# Patient Record
Sex: OTHER/UNKNOWN | URBAN - NONMETROPOLITAN AREA
[De-identification: names, ages, dates, MRNs, and addresses within clinical notes are randomized per-mention and may not be internally consistent; named-entity substitution may affect disease eponyms.]

---

## 2024-08-07 ENCOUNTER — APPOINTMENT (OUTPATIENT)
Dept: URBAN - NONMETROPOLITAN AREA SURGERY 8 | Age: 49
Setting detail: DERMATOLOGY
End: 2024-08-15

## 2024-08-07 DIAGNOSIS — L71.8 OTHER ROSACEA: ICD-10-CM

## 2024-08-07 PROCEDURE — OTHER COUNSELING: OTHER

## 2024-08-07 PROCEDURE — OTHER PRESCRIPTION: OTHER

## 2024-08-07 PROCEDURE — 99203 OFFICE O/P NEW LOW 30 MIN: CPT

## 2024-08-07 PROCEDURE — OTHER MIPS QUALITY: OTHER

## 2024-08-07 RX ORDER — MINOCYCLINE HYDROCHLORIDE 100 MG/1
CAPSULE ORAL BID
Qty: 30 | Refills: 1 | Status: ERX | COMMUNITY
Start: 2024-08-07

## 2024-08-07 ASSESSMENT — LOCATION DETAILED DESCRIPTION DERM: LOCATION DETAILED: LEFT INFERIOR CENTRAL MALAR CHEEK

## 2024-08-07 ASSESSMENT — LOCATION ZONE DERM: LOCATION ZONE: FACE

## 2024-08-07 ASSESSMENT — LOCATION SIMPLE DESCRIPTION DERM: LOCATION SIMPLE: LEFT CHEEK

## 2024-08-07 NOTE — PROCEDURE: MIPS QUALITY
Detail Level: Detailed
Quality 431: Preventive Care And Screening: Unhealthy Alcohol Use - Screening: Patient not identified as an unhealthy alcohol user when screened for unhealthy alcohol use using a systematic screening method
Quality 110: Preventive Care And Screening: Influenza Immunization: Influenza Immunization previously received during influenza season
Quality 402: Tobacco Use And Help With Quitting Among Adolescents: Patient screened for tobacco and never smoked
Quality 226: Preventive Care And Screening: Tobacco Use: Screening And Cessation Intervention: Patient screened for tobacco use and is an ex/non-smoker
Quality 130: Documentation Of Current Medications In The Medical Record: Current Medications Documented

## 2024-08-07 NOTE — PROCEDURE: COUNSELING
Topical Retinoids Recommendations: For some with rosacea, retinoid based products may be irritating and to be used with caution. Mechanical scrubs and exfoliants may be irritating to the skin and should be used with caution.
Moisturizer Recommendations: AM Use: Cerave AM, Cetaphil Oil Control or any other oil-free facial moisturizer with SPF 30+. Sunscreen should be applied each and every day, regardless of the weather or activity level\\nPM Use: Cerave PM can be applied over topical medications (wait 15 minutes between applications)
Detail Level: Detailed
Laser Recommendations: Vascular lasers that target background redness or telangectasias may be recommended. These lasers are often not covered by insurance but may be covered by some FSA or HSA plans .\\nOther types of laser therapies may be used to manage textural changes of the skin, due to rosacea.
Sunscreen Recommendations: Facial Sunscreens as noted above. Sunscreen should be applied daily and then q 80 minutes to exposed areas. There are many different sunscreen products on the market and may be found in a cream/spray/lotion/stick/powder. Use whatever product that feels good on your skin.
Cleanser Recommendations: Cerave Gentle Cleanser\\nCerave Foaming Cleanser\\nIf instructed, a benzoyl peroxide wash or salacylic acid wash can be purchased over the counter
Ipl Recommendations: Intense Pulse LIght or Broad Band Light can be used as needed to manage background erythema. The frequency of treatment will depend on desired goals. these light therapies need to be repeated over time. the light therapies are often not covered by insurance but may be covered by some FSA or HSA plans

## 2024-09-26 ENCOUNTER — APPOINTMENT (OUTPATIENT)
Dept: URBAN - NONMETROPOLITAN AREA SURGERY 8 | Age: 49
Setting detail: DERMATOLOGY
End: 2024-09-26

## 2024-09-26 ENCOUNTER — APPOINTMENT (OUTPATIENT)
Dept: URBAN - NONMETROPOLITAN AREA SURGERY 8 | Age: 49
Setting detail: DERMATOLOGY
End: 2024-09-30

## 2024-09-26 DIAGNOSIS — D485 NEOPLASM OF UNCERTAIN BEHAVIOR OF SKIN: ICD-10-CM

## 2024-09-26 DIAGNOSIS — L71.8 OTHER ROSACEA: ICD-10-CM

## 2024-09-26 PROBLEM — D48.5 NEOPLASM OF UNCERTAIN BEHAVIOR OF SKIN: Status: ACTIVE | Noted: 2024-09-26

## 2024-09-26 PROCEDURE — OTHER COUNSELING: OTHER

## 2024-09-26 PROCEDURE — OTHER PRESCRIPTION: OTHER

## 2024-09-26 PROCEDURE — 99214 OFFICE O/P EST MOD 30 MIN: CPT | Mod: 25

## 2024-09-26 PROCEDURE — OTHER PRESCRIPTION MEDICATION MANAGEMENT: OTHER

## 2024-09-26 PROCEDURE — 11102 TANGNTL BX SKIN SINGLE LES: CPT

## 2024-09-26 PROCEDURE — OTHER BIOPSY BY SHAVE METHOD: OTHER

## 2024-09-26 PROCEDURE — OTHER MIPS QUALITY: OTHER

## 2024-09-26 RX ORDER — CREAM BASE NO.171
CREAM (GRAM) MISCELLANEOUS
Qty: 30 | Refills: 12 | Status: ACTIVE

## 2024-09-26 ASSESSMENT — LOCATION SIMPLE DESCRIPTION DERM
LOCATION SIMPLE: LEFT CHEEK
LOCATION SIMPLE: LEFT UPPER BACK

## 2024-09-26 ASSESSMENT — LOCATION ZONE DERM
LOCATION ZONE: FACE
LOCATION ZONE: TRUNK

## 2024-09-26 ASSESSMENT — LOCATION DETAILED DESCRIPTION DERM
LOCATION DETAILED: LEFT INFERIOR LATERAL UPPER BACK
LOCATION DETAILED: LEFT INFERIOR CENTRAL MALAR CHEEK

## 2024-09-26 NOTE — PROCEDURE: MIPS QUALITY
Detail Level: Detailed
Quality 402: Tobacco Use And Help With Quitting Among Adolescents: Patient screened for tobacco and never smoked
Quality 431: Preventive Care And Screening: Unhealthy Alcohol Use - Screening: Patient not identified as an unhealthy alcohol user when screened for unhealthy alcohol use using a systematic screening method
Quality 130: Documentation Of Current Medications In The Medical Record: Current Medications Documented
Quality 226: Preventive Care And Screening: Tobacco Use: Screening And Cessation Intervention: Patient screened for tobacco use and is an ex/non-smoker
Quality 110: Preventive Care And Screening: Influenza Immunization: Influenza Immunization previously received during influenza season

## 2024-09-26 NOTE — PROCEDURE: BIOPSY BY SHAVE METHOD
Body Location Override (Optional - Billing Will Still Be Based On Selected Body Map Location If Applicable): left lateral back
Detail Level: Detailed
Depth Of Biopsy: dermis
Was A Bandage Applied: Yes
Size Of Lesion In Cm: 0
Biopsy Type: H and E
Biopsy Method: Dermablade
Anesthesia Type: 1% lidocaine with epinephrine
Anesthesia Volume In Cc: 0.5
Hemostasis: Drysol
Wound Care: Petrolatum
Dressing: bandage
Destruction After The Procedure: No
Type Of Destruction Used: Curettage
Curettage Text: The wound bed was treated with curettage after the biopsy was performed.
Cryotherapy Text: The wound bed was treated with cryotherapy after the biopsy was performed.
Electrodesiccation Text: The wound bed was treated with electrodesiccation after the biopsy was performed.
Electrodesiccation And Curettage Text: The wound bed was treated with electrodesiccation and curettage after the biopsy was performed.
Silver Nitrate Text: The wound bed was treated with silver nitrate after the biopsy was performed.
Lab: -[id number]
Consent: Written consent was obtained and risks were reviewed including but not limited to scarring, infection, bleeding, scabbing, incomplete removal, nerve damage and allergy to anesthesia.
Post-Care Instructions: I reviewed with the patient in detail post-care instructions. Patient is to keep the biopsy site dry overnight, and then apply bacitracin twice daily until healed. Patient may apply hydrogen peroxide soaks to remove any crusting.
Notification Instructions: Patient will be notified of biopsy results. However, patient instructed to call the office if not contacted within 2 weeks.
Billing Type: Third-Party Bill
Information: Selecting Yes will display possible errors in your note based on the variables you have selected. This validation is only offered as a suggestion for you. PLEASE NOTE THAT THE VALIDATION TEXT WILL BE REMOVED WHEN YOU FINALIZE YOUR NOTE. IF YOU WANT TO FAX A PRELIMINARY NOTE YOU WILL NEED TO TOGGLE THIS TO 'NO' IF YOU DO NOT WANT IT IN YOUR FAXED NOTE.

## 2024-09-26 NOTE — PROCEDURE: COUNSELING
Topical Retinoids Recommendations: For some with rosacea, retinoid based products may be irritating and to be used with caution. Mechanical scrubs and exfoliants may be irritating to the skin and should be used with caution.
Detail Level: Detailed
Moisturizer Recommendations: AM Use: Cerave AM, Cetaphil Oil Control or any other oil-free facial moisturizer with SPF 30+. Sunscreen should be applied each and every day, regardless of the weather or activity level\\nPM Use: Cerave PM can be applied over topical medications (wait 15 minutes between applications)
Laser Recommendations: Vascular lasers that target background redness or telangectasias may be recommended. These lasers are often not covered by insurance but may be covered by some FSA or HSA plans .\\nOther types of laser therapies may be used to manage textural changes of the skin, due to rosacea.
Sunscreen Recommendations: Facial Sunscreens as noted above. Sunscreen should be applied daily and then q 80 minutes to exposed areas. There are many different sunscreen products on the market and may be found in a cream/spray/lotion/stick/powder. Use whatever product that feels good on your skin.
Cleanser Recommendations: Cerave Gentle Cleanser\\nCerave Foaming Cleanser\\nIf instructed, a benzoyl peroxide wash or salacylic acid wash can be purchased over the counter
Ipl Recommendations: Intense Pulse LIght or Broad Band Light can be used as needed to manage background erythema. The frequency of treatment will depend on desired goals. these light therapies need to be repeated over time. the light therapies are often not covered by insurance but may be covered by some FSA or HSA plans

## 2024-09-26 NOTE — PROCEDURE: PRESCRIPTION MEDICATION MANAGEMENT
Continue Regimen: triple cream qd.\\nwe discussed lasers and lights as a complement to treatment
Render In Strict Bullet Format?: No
Detail Level: Zone

## 2025-01-29 ENCOUNTER — APPOINTMENT (OUTPATIENT)
Dept: URBAN - NONMETROPOLITAN AREA SURGERY 8 | Age: 50
Setting detail: DERMATOLOGY
End: 2025-01-30

## 2025-01-29 DIAGNOSIS — L71.8 OTHER ROSACEA: ICD-10-CM

## 2025-01-29 DIAGNOSIS — L82.0 INFLAMED SEBORRHEIC KERATOSIS: ICD-10-CM

## 2025-01-29 PROCEDURE — OTHER PRESCRIPTION MEDICATION MANAGEMENT: OTHER

## 2025-01-29 PROCEDURE — OTHER PRESCRIPTION: OTHER

## 2025-01-29 PROCEDURE — OTHER MIPS QUALITY: OTHER

## 2025-01-29 PROCEDURE — 99214 OFFICE O/P EST MOD 30 MIN: CPT

## 2025-01-29 PROCEDURE — OTHER COUNSELING: OTHER

## 2025-01-29 PROCEDURE — OTHER OTC TREATMENT REGIMEN: OTHER

## 2025-01-29 PROCEDURE — OTHER ADDITIONAL NOTES: OTHER

## 2025-01-29 RX ORDER — MINOCYCLINE HYDROCHLORIDE 100 MG/1
CAPSULE ORAL BID
Qty: 30 | Refills: 1 | Status: ERX

## 2025-01-29 ASSESSMENT — LOCATION DETAILED DESCRIPTION DERM
LOCATION DETAILED: LEFT CENTRAL MALAR CHEEK
LOCATION DETAILED: RIGHT CENTRAL MALAR CHEEK
LOCATION DETAILED: LEFT LATERAL ABDOMEN

## 2025-01-29 ASSESSMENT — LOCATION SIMPLE DESCRIPTION DERM
LOCATION SIMPLE: RIGHT CHEEK
LOCATION SIMPLE: LEFT CHEEK
LOCATION SIMPLE: ABDOMEN

## 2025-01-29 ASSESSMENT — LOCATION ZONE DERM
LOCATION ZONE: TRUNK
LOCATION ZONE: FACE

## 2025-01-29 NOTE — PROCEDURE: ADDITIONAL NOTES
Additional Notes: She may return for ln2 of an ISK on the left abdomen
Render Risk Assessment In Note?: no
Detail Level: Simple
Additional Notes: In addition to medication management, we have discussed the use of lasers and lights.

## 2025-01-29 NOTE — PROCEDURE: OTC TREATMENT REGIMEN
Detail Level: Zone
Patient Specific Otc Recommendations (Will Not Stick From Patient To Patient): Cerave moisturizing cream mixed with triple cream

## 2025-01-29 NOTE — PROCEDURE: PRESCRIPTION MEDICATION MANAGEMENT
Render In Strict Bullet Format?: No
Detail Level: Zone
Plan: Information for the [facility] is given to the patient
Continue Regimen: Increase use of Triple cream to each morning and night. \\nOxymetazoline as needed.\\nMinocycline 50-100mg daily as needed for flares. She is to minimize the use of oral antibiotics.

## 2025-01-29 NOTE — PROCEDURE: COUNSELING
Topical Retinoids Recommendations: For some with rosacea, retinoid based products may be irritating and to be used with caution. Mechanical scrubs and exfoliants may be irritating to the skin and should be used with caution.
Detail Level: Zone
Moisturizer Recommendations: AM Use: Cerave AM, Cetaphil Oil Control or any other oil-free facial moisturizer with SPF 30+. Sunscreen should be applied each and every day, regardless of the weather or activity level\\nPM Use: Cerave PM can be applied over topical medications (wait 15 minutes between applications)
Laser Recommendations: Vascular lasers that target background redness or telangectasias may be recommended. These lasers are often not covered by insurance but may be covered by some FSA or HSA plans .\\nOther types of laser therapies may be used to manage textural changes of the skin, due to rosacea.
Sunscreen Recommendations: Facial Sunscreens as noted above. Sunscreen should be applied daily and then q 80 minutes to exposed areas. There are many different sunscreen products on the market and may be found in a cream/spray/lotion/stick/powder. Use whatever product that feels good on your skin.
Cleanser Recommendations: Cerave Gentle Cleanser\\nCerave Foaming Cleanser\\nIf instructed, a benzoyl peroxide wash or salacylic acid wash can be purchased over the counter
Ipl Recommendations: Intense Pulse LIght or Broad Band Light can be used as needed to manage background erythema. The frequency of treatment will depend on desired goals. these light therapies need to be repeated over time. the light therapies are often not covered by insurance but may be covered by some FSA or HSA plans
Detail Level: Simple